# Patient Record
Sex: MALE | Race: WHITE | URBAN - METROPOLITAN AREA
[De-identification: names, ages, dates, MRNs, and addresses within clinical notes are randomized per-mention and may not be internally consistent; named-entity substitution may affect disease eponyms.]

---

## 2018-04-10 ENCOUNTER — APPOINTMENT (RX ONLY)
Dept: URBAN - METROPOLITAN AREA CLINIC 12 | Facility: CLINIC | Age: 48
Setting detail: DERMATOLOGY
End: 2018-04-10

## 2018-04-10 DIAGNOSIS — D22 MELANOCYTIC NEVI: ICD-10-CM

## 2018-04-10 DIAGNOSIS — L71.8 OTHER ROSACEA: ICD-10-CM

## 2018-04-10 DIAGNOSIS — L91.8 OTHER HYPERTROPHIC DISORDERS OF THE SKIN: ICD-10-CM

## 2018-04-10 PROBLEM — D22.5 MELANOCYTIC NEVI OF TRUNK: Status: ACTIVE | Noted: 2018-04-10

## 2018-04-10 PROBLEM — L85.3 XEROSIS CUTIS: Status: ACTIVE | Noted: 2018-04-10

## 2018-04-10 PROBLEM — L70.0 ACNE VULGARIS: Status: ACTIVE | Noted: 2018-04-10

## 2018-04-10 PROBLEM — L29.8 OTHER PRURITUS: Status: ACTIVE | Noted: 2018-04-10

## 2018-04-10 PROCEDURE — ? TREATMENT REGIMEN

## 2018-04-10 PROCEDURE — ? IN-HOUSE DISPENSING PHARMACY

## 2018-04-10 PROCEDURE — ? COUNSELING

## 2018-04-10 PROCEDURE — ? DEFER

## 2018-04-10 PROCEDURE — ? PRESCRIPTION

## 2018-04-10 PROCEDURE — 99213 OFFICE O/P EST LOW 20 MIN: CPT

## 2018-04-10 RX ORDER — DOXYCYCLINE 40 MG/1
CAPSULE ORAL
Qty: 30 | Refills: 11 | Status: ERX | COMMUNITY
Start: 2018-04-10

## 2018-04-10 RX ADMIN — DOXYCYCLINE: 40 CAPSULE ORAL at 00:00

## 2018-04-10 ASSESSMENT — LOCATION SIMPLE DESCRIPTION DERM
LOCATION SIMPLE: RIGHT UPPER BACK
LOCATION SIMPLE: RIGHT CHEEK
LOCATION SIMPLE: LEFT AXILLARY VAULT
LOCATION SIMPLE: LEFT CHEEK

## 2018-04-10 ASSESSMENT — LOCATION DETAILED DESCRIPTION DERM
LOCATION DETAILED: LEFT AXILLARY VAULT
LOCATION DETAILED: RIGHT CENTRAL MALAR CHEEK
LOCATION DETAILED: RIGHT INFERIOR MEDIAL UPPER BACK
LOCATION DETAILED: LEFT CENTRAL MALAR CHEEK

## 2018-04-10 ASSESSMENT — LOCATION ZONE DERM
LOCATION ZONE: AXILLAE
LOCATION ZONE: FACE
LOCATION ZONE: TRUNK

## 2018-04-10 NOTE — PROCEDURE: TREATMENT REGIMEN
Initiate Treatment: In-house Rosacea cream once to twice per day.\\nTake Oracea 40mg per day.
Detail Level: Simple

## 2018-04-10 NOTE — HPI: RASH
How Severe Is Your Rash?: moderate
Is This A New Presentation, Or A Follow-Up?: Rash
Additional History: Patient said he has been using Desonide off and on over the years.  He said sun exposure, sunscreen and heat seem to trigger the redness. He has been diagnosed with Rosacea and fungal infection before.  He has taken oral doxycycline, metrogel and Desonide.  He has also used topical and oral Ketaconazole in the past which has worked really well.

## 2018-04-10 NOTE — HPI: SKIN LESION
Is This A New Presentation, Or A Follow-Up?: Skin Lesions
How Severe Is Your Skin Lesion?: moderate
Has Your Skin Lesion Been Treated?: not been treated
Additional History: Patient wants the growths removed at some point but not today.

## 2018-06-14 ENCOUNTER — APPOINTMENT (RX ONLY)
Dept: URBAN - METROPOLITAN AREA CLINIC 12 | Facility: CLINIC | Age: 48
Setting detail: DERMATOLOGY
End: 2018-06-14

## 2018-06-14 DIAGNOSIS — D22 MELANOCYTIC NEVI: ICD-10-CM

## 2018-06-14 DIAGNOSIS — L71.8 OTHER ROSACEA: ICD-10-CM

## 2018-06-14 DIAGNOSIS — Z41.9 ENCOUNTER FOR PROCEDURE FOR PURPOSES OTHER THAN REMEDYING HEALTH STATE, UNSPECIFIED: ICD-10-CM

## 2018-06-14 DIAGNOSIS — L91.8 OTHER HYPERTROPHIC DISORDERS OF THE SKIN: ICD-10-CM

## 2018-06-14 PROBLEM — D22.5 MELANOCYTIC NEVI OF TRUNK: Status: ACTIVE | Noted: 2018-06-14

## 2018-06-14 PROCEDURE — ? COSMETIC CONSULTATION - RED SPOTS

## 2018-06-14 PROCEDURE — ? SKIN TAG REMOVAL MULTI

## 2018-06-14 PROCEDURE — ? IN-HOUSE DISPENSING PHARMACY

## 2018-06-14 PROCEDURE — 11200 RMVL SKIN TAGS UP TO&INC 15: CPT

## 2018-06-14 PROCEDURE — ? TREATMENT REGIMEN

## 2018-06-14 PROCEDURE — ? COUNSELING

## 2018-06-14 PROCEDURE — 99213 OFFICE O/P EST LOW 20 MIN: CPT | Mod: 25

## 2018-06-14 ASSESSMENT — LOCATION DETAILED DESCRIPTION DERM
LOCATION DETAILED: RIGHT INFERIOR MEDIAL UPPER BACK
LOCATION DETAILED: RIGHT CENTRAL MALAR CHEEK
LOCATION DETAILED: LEFT POSTERIOR AXILLA
LOCATION DETAILED: LEFT CENTRAL MALAR CHEEK
LOCATION DETAILED: RIGHT BUTTOCK
LOCATION DETAILED: RIGHT CENTRAL MALAR CHEEK
LOCATION DETAILED: LEFT CENTRAL MALAR CHEEK

## 2018-06-14 ASSESSMENT — LOCATION ZONE DERM
LOCATION ZONE: FACE
LOCATION ZONE: TRUNK
LOCATION ZONE: AXILLAE
LOCATION ZONE: FACE

## 2018-06-14 ASSESSMENT — LOCATION SIMPLE DESCRIPTION DERM
LOCATION SIMPLE: RIGHT BUTTOCK
LOCATION SIMPLE: LEFT CHEEK
LOCATION SIMPLE: LEFT CHEEK
LOCATION SIMPLE: RIGHT CHEEK
LOCATION SIMPLE: RIGHT CHEEK
LOCATION SIMPLE: LEFT POSTERIOR AXILLA
LOCATION SIMPLE: RIGHT UPPER BACK

## 2018-06-14 NOTE — PROCEDURE: SKIN TAG REMOVAL MULTI
Include Z78.9 (Other Specified Conditions Influencing Health Status) As An Associated Diagnosis?: No
Total Number Of Lesions Treated: 1
Consent: Written consent obtained and the risks of skin tag removal was reviewed with the patient including but not limited to bleeding, pigmentary change, infection, pain, and remote possibility of scarring.
Medical Necessity Information: It is in your best interest to select a reason for this procedure from the list below. All of these items fulfill various CMS LCD requirements except the new and changing color options.
Hemostasis: Electrocautery
Anesthesia Volume In Cc: 0
Detail Level: Simple
Add Associated Diagnoses If Applicable When Selecting Medical Necessity: Yes
Medical Necessity Clause: This procedure was medically necessary because the lesions that were treated were: irritated
Anesthesia Type: 1% lidocaine with epinephrine and a 1:10 solution of 8.4% sodium bicarbonate

## 2018-06-14 NOTE — PROCEDURE: TREATMENT REGIMEN
Detail Level: Simple
Initiate Treatment: In-house Rosacea cream once to twice per day.\\nTake Oracea 40mg per day.

## 2018-06-14 NOTE — PROCEDURE: IN-HOUSE DISPENSING PHARMACY
Product 1 Price/Unit (In Dollars): 40.00
Product 71 Units Dispensed: 0
Product 8 Application Directions: Apply topically to scalp three times per week for 4-6weeks
Product 10 Amount/Unit (Numbers Only): 30
Product 25 Unit Type: ml
Product 19 Unit Type: mg
Product 16 Refills: 2
Product 18 Refills: 3
Product 7 Application Directions: Apply topically to face Qhs
Product 7 Price/Unit (In Dollars): 45.00
Product 7 Amount/Unit (Numbers Only): 30
Product 11 Application Directions: Apply topically to  affected area's QD-BID
Name Of Product 3: Tret 0.1% Cream - ([id number])\\nNiacinamide 4% - Tretinoin 0.1%
Product 4 Application Directions: Apply a pea-sized amount to face at bedtime
Product 6 Price/Unit (In Dollars): 35.00
Product 12 Application Directions: Apply to affected areas bid for up to 2 weeks
Name Of Product 11: Desonide Dermatitis Cream - ([id number]) \\nDesonide 0.05% - Niacinamide 4%
Product 31 Refills: 1
Product 2 Application Directions: Apply a pea size amount to face every night
Product 18 Application Directions: Apply to warts then occlusion at bedtime
Product 14 Dang/Unit (In Dollars): 25.00
Product 1 Application Directions: Apply a pea sized amount for entire face at bed time.
Name Of Product 5: Acne Gel with Dapsone  ([id number])\\nDapsone 8.5% - Niacinamide 2% - Spironolactone 5%
Product 16 Application Directions: Apply topically to affected area bid for 2 weeks then once per day for a week then discontinue
Detail Level: Zone
Product 6 Application Directions: Apply topically to affected area's QD - BID
Name Of Product 13: Clob Ointment - ([id number]) \\nClobetasol Propionate 0.05% - Niacinamide 4%
Render Product Pricing In Note: Yes
Product 10 Application Directions: Apply to wound BID X 1-2 weeks
Product 14 Amount/Unit (Numbers Only): 60
Name Of Product 14: Clob Cream - ([id number]) \\nClobetasol Propionate 0.05% - Niacinamide 4%
Product 12 Amount/Unit (Numbers Only): 60
Product 2 Refills: 6
Product 15 Application Directions: Use bid for 2 weeks then stop using for 10-14 days before reusing
Name Of Product 17: Rosacea Cream - ([id number])\\nIvermectin 1% - Metronidazole 1% - Niacinamide 4% - Potassium Azeloyl Diglycinate 5%
Name Of Product 7: Acne Gel  ([id number])\\nAdapalene 0.3% - Benzoyl Peroxide 2.5% - Niacinamide - 4%
Product 10 Price/Unit (In Dollars): 20.00
Name Of Product 2: Tret 0.05% Cream - ([id number])\\n Niacinamide 4% - Tretinoin 0.05%
Product 7 Refills: 4
Product 23 Unit Type: grams
Name Of Product 12: Clob Solution - (659027) \\nClobetasol Propionate 0.05% - Niacinamide 4%
Name Of Product 10: Antibacterial Wound Ointment  ([id number]) \\nAloe Vera 0.2% - Lidocaine 2% - Mupirocin 2%-Tranilast 1%
Name Of Product 4: Archie 0.1% Cream - (016561) \\nNiacinamide 4% - Tazoratene 0.1%
Name Of Product 1: Tret 0.025% Cream - (552610)\\n QQNiacinamide 4% - Tretinoin 0.025%
Name Of Product 9: Anti-Fungal Nail Solution (921318) \\nCiclopirox 8% - Fluconazole 1% - Terbinafine 1%
Product 9 Amount/Unit (Numbers Only): 15
Name Of Product 18: Wart Gel - (957525) \\nCimetidine 5% - Ibuprofen 2% - Salicylic Acid 17%
Product 13 Application Directions: Apply to affected areas as directed.
Name Of Product 6: Acne Gel Combo   ([id number])\\nBenzoyl Peroxide 5% - Clindamycin 1% - Niacinamide 4%
Product 9 Application Directions: Apply to affected toenails QD for 1 year.
Product 17 Application Directions: Apply topically to face QD
Name Of Product 8: Actinic Keratosis Gel  ([id number])\\nImiquimod 5% - Levocetirizine Dihydrochloride 1% - Niacinamide 2%
Product 14 Application Directions: Apply bid to affected areas for up to 3 weeks then stop using 10-14 days before reusing.
Name Of Product 15: Triam Dermatitis Cream - (140950) \\nNiacinamide 4% - Triamcinolone Acetonide 0.1%
Name Of Product 16: Fluocinolone Scalp & Body Oil - (014076) \\nFluocinolone Acetonide 0.01% In Emu and Olive Oil

## 2018-06-26 ENCOUNTER — APPOINTMENT (RX ONLY)
Dept: URBAN - METROPOLITAN AREA CLINIC 12 | Facility: CLINIC | Age: 48
Setting detail: DERMATOLOGY
End: 2018-06-26

## 2018-06-26 DIAGNOSIS — Z41.9 ENCOUNTER FOR PROCEDURE FOR PURPOSES OTHER THAN REMEDYING HEALTH STATE, UNSPECIFIED: ICD-10-CM

## 2018-06-26 PROCEDURE — ? PALOMAR ICON LASER

## 2018-06-26 ASSESSMENT — LOCATION DETAILED DESCRIPTION DERM
LOCATION DETAILED: LEFT CENTRAL MALAR CHEEK
LOCATION DETAILED: RIGHT CENTRAL MALAR CHEEK
LOCATION DETAILED: LEFT INFERIOR CENTRAL MALAR CHEEK
LOCATION DETAILED: NASAL DORSUM

## 2018-06-26 ASSESSMENT — LOCATION ZONE DERM
LOCATION ZONE: FACE
LOCATION ZONE: NOSE

## 2018-06-26 ASSESSMENT — LOCATION SIMPLE DESCRIPTION DERM
LOCATION SIMPLE: RIGHT CHEEK
LOCATION SIMPLE: LEFT CHEEK
LOCATION SIMPLE: NOSE

## 2018-06-26 NOTE — PROCEDURE: PALOMAR ICON LASER
Detail Level: Zone
Fluence: 38
Anesthesia Volume In Cc: 0
Overlap: 20%
Treatment Number: 1
Fluence Units: J/cm2
Price (Use Numbers Only, No Special Characters Or $): 200.00
Pulse Duration (In Milliseconds): 10
Treated Area: small area
Consent: Written consent obtained, risks reviewed including but not limited to crusting, scabbing, blistering, scarring, darker or lighter pigmentary change, bruising, and/or incomplete response.
Render Post Care In The Note?: yes
Pre-Procedure Text: The patients skin was cleaned with 70% Isopropyl Alcohol.
Post-Care Instructions: I reviewed with the patient in detail post-care instructions. Patient should stay away from the sun and wear sun protection until treated areas are fully healed.
Fluence: 38
Fluence: 40
Pulse Duration (In Milliseconds): 20

## 2018-07-12 ENCOUNTER — APPOINTMENT (RX ONLY)
Dept: URBAN - METROPOLITAN AREA CLINIC 12 | Facility: CLINIC | Age: 48
Setting detail: DERMATOLOGY
End: 2018-07-12

## 2018-07-12 DIAGNOSIS — Z41.9 ENCOUNTER FOR PROCEDURE FOR PURPOSES OTHER THAN REMEDYING HEALTH STATE, UNSPECIFIED: ICD-10-CM

## 2018-07-12 PROCEDURE — ? PALOMAR ICON LASER

## 2018-07-12 ASSESSMENT — LOCATION DETAILED DESCRIPTION DERM
LOCATION DETAILED: RIGHT CENTRAL MALAR CHEEK
LOCATION DETAILED: NASAL TIP
LOCATION DETAILED: LEFT CENTRAL MALAR CHEEK
LOCATION DETAILED: RIGHT INFERIOR MEDIAL MALAR CHEEK

## 2018-07-12 ASSESSMENT — LOCATION ZONE DERM
LOCATION ZONE: FACE
LOCATION ZONE: NOSE

## 2018-07-12 ASSESSMENT — LOCATION SIMPLE DESCRIPTION DERM
LOCATION SIMPLE: RIGHT CHEEK
LOCATION SIMPLE: NOSE
LOCATION SIMPLE: LEFT CHEEK

## 2018-07-12 NOTE — PROCEDURE: PALOMAR ICON LASER
Price (Use Numbers Only, No Special Characters Or $): 0.00
Anesthesia Volume In Cc: 0
Number Of Passes: 1
Detail Level: Zone
Fluence: 38
Overlap: 20%
Fluence Units: J/cm2
Treatment Number: 2
Treated Area: small area
Pulse Duration (In Milliseconds): 15
Pre-Procedure Text: The patients skin was cleaned with 70% Isopropyl Alcohol.
Pulse Duration (In Milliseconds): 20
Post-Care Instructions: I reviewed with the patient in detail post-care instructions. Patient should stay away from the sun and wear sun protection until treated areas are fully healed.
Pulse Duration (In Milliseconds): 20
Fluence: 40
Fluence: 36
Render Post Care In The Note?: yes
Consent: Written consent obtained, risks reviewed including but not limited to crusting, scabbing, blistering, scarring, darker or lighter pigmentary change, bruising, and/or incomplete response.

## 2019-01-08 ENCOUNTER — APPOINTMENT (RX ONLY)
Dept: URBAN - METROPOLITAN AREA CLINIC 12 | Facility: CLINIC | Age: 49
Setting detail: DERMATOLOGY
End: 2019-01-08

## 2019-01-08 DIAGNOSIS — L71.8 OTHER ROSACEA: ICD-10-CM

## 2019-01-08 PROCEDURE — ? TREATMENT REGIMEN

## 2019-01-08 PROCEDURE — 99212 OFFICE O/P EST SF 10 MIN: CPT

## 2019-01-08 PROCEDURE — ? IN-HOUSE DISPENSING PHARMACY

## 2019-01-08 PROCEDURE — ? COUNSELING

## 2019-01-08 ASSESSMENT — LOCATION DETAILED DESCRIPTION DERM
LOCATION DETAILED: NASAL SUPRATIP
LOCATION DETAILED: LEFT MEDIAL MALAR CHEEK
LOCATION DETAILED: RIGHT CENTRAL MALAR CHEEK
LOCATION DETAILED: RIGHT MEDIAL MALAR CHEEK

## 2019-01-08 ASSESSMENT — LOCATION ZONE DERM
LOCATION ZONE: NOSE
LOCATION ZONE: FACE

## 2019-01-08 ASSESSMENT — LOCATION SIMPLE DESCRIPTION DERM
LOCATION SIMPLE: RIGHT CHEEK
LOCATION SIMPLE: LEFT CHEEK
LOCATION SIMPLE: NOSE

## 2019-01-08 NOTE — PROCEDURE: IN-HOUSE DISPENSING PHARMACY
Product 63 Refills: 0
Product 23 Unit Type: grams
Product 72 Unit Type: mg
Product 14 Dang/Unit (In Dollars): 45.00
Product 8 Amount/Unit (Numbers Only): 30
Product 11 Price/Unit (In Dollars): 35.00
Product 21 Application Directions: Apply to affected areas bid x 1 week, qd x 1 week then taper off
Product 1 Refills: 6
Product 12 Unit Type: ml
Product 4 Refills: 3
Product 16 Amount/Unit (Numbers Only): 60
Product 14 Refills: 1
Product 15 Application Directions: Use bid for 2 weeks then stop using for 10-14 days before reusing
Detail Level: Zone
Name Of Product 13: Clob Ointment - ([id number]) \\nClobetasol Propionate 0.05% - Niacinamide 4%
Name Of Product 8: Actinic Keratosis Gel  ([id number])\\nImiquimod 5% - Levocetirizine Dihydrochloride 1% - Niacinamide 2%
Product 2 Application Directions: Apply a pea size amount to face every night
Product 10 Application Directions: Apply to wound BID X 1-2 weeks
Product 6 Refills: 2
Product 3 Application Directions: Apply a pea sized amount for entire face at bed time.
Product 5 Amount/Unit (Numbers Only): 30
Product 14 Amount/Unit (Numbers Only): 60
Send Charges To Patient Encounter: Yes
Product 7 Refills: 4
Product 13 Application Directions: Apply to affected areas as directed.
Product 18 Application Directions: Apply to warts then occlusion at bedtime
Name Of Product 10: Antibacterial Wound Ointment  ([id number]) \\nAloe Vera 0.2% - Lidocaine 2% - Mupirocin 2%-Tranilast 1%
Name Of Product 21: Fluocinolone cream 0.01%
Name Of Product 15: Triam Dermatitis Cream - (457058) \\nNiacinamide 4% - Triamcinolone Acetonide 0.1%
Name Of Product 5: Acne Gel with Dapsone  ([id number])\\nDapsone 8.5% - Niacinamide 2% - Spironolactone 5%
Name Of Product 2: Tret 0.05% Cream - (416346)\\n Niacinamide 4% - Tretinoin 0.05%
Product 8 Application Directions: Apply topically to affected areas Qd Monday, Wednesday and Friday for 4-6 weeks.
Name Of Product 3: Tret 0.1% Cream - (167030)\\nNiacinamide 4% - Tretinoin 0.1%
Product 1 Price/Unit (In Dollars): 40.00
Name Of Product 6: Acne Gel Combo   ([id number])\\nBenzoyl Peroxide 5% - Clindamycin 1% - Niacinamide 4%
Product 16 Application Directions: Apply topically to affected area bid for 2 weeks then once per day for a week then discontinue
Name Of Product 1: Tret 0.025% Cream - (368057)\\n QQNiacinamide 4% - Tretinoin 0.025%
Name Of Product 12: Clob Solution - (930444) \\nClobetasol Propionate 0.05% - Niacinamide 4%
Product 9 Application Directions: Apply to affected toenails Bid for 3-4 weeks
Name Of Product 18: Wart Gel - ([id number]) \\nCimetidine 5% - Ibuprofen 2% - Salicylic Acid 17%
Product 5 Application Directions: Apply topically to affected area's QD - BID
Product 4 Application Directions: Apply a pea-sized amount to face at bedtime
Product 9 Amount/Unit (Numbers Only): 15
Product 14 Application Directions: Apply bid to affected areas for up to 3 weeks then stop using 10-14 days before reusing.
Name Of Product 9: Anti-Fungal Nail Solution (104859) \\nCiclopirox 8% - Fluconazole 1% - Terbinafine 1%
Name Of Product 17: Rosacea Cream - (730842)\\nIvermectin 1% - Metronidazole 1% - Niacinamide 4% - Potassium Azeloyl Diglycinate 5%
Product 11 Application Directions: Apply topically to  affected area's QD-BID
Name Of Product 16: Fluocinolone Scalp & Body Oil - (556476) \\nFluocinolone Acetonide 0.01% In Emu and Olive Oil
Product 7 Application Directions: Apply topically to face Qhs
Product 17 Application Directions: Apply topically to face QD
Product 12 Application Directions: Apply to affected areas bid for up to 2 weeks
Name Of Product 4: Archie 0.1% Cream - (975842) \\nNiacinamide 4% - Tazoratene 0.1%
Product 10 Price/Unit (In Dollars): 20.00
Name Of Product 14: Clob Cream - (855151) \\nClobetasol Propionate 0.05% - Niacinamide 4%
Name Of Product 11: Desonide Dermatitis Cream - (132869) \\nDesonide 0.05% - Niacinamide 4%
Name Of Product 7: Acne Gel  ([id number])\\nAdapalene 0.3% - Benzoyl Peroxide 2.5% - Niacinamide - 4%

## 2019-01-08 NOTE — PROCEDURE: MIPS QUALITY
Detail Level: Generalized
Quality 110: Preventive Care And Screening: Influenza Immunization: Influenza Immunization not Administered because Patient Refused.
Quality 130: Documentation Of Current Medications In The Medical Record: Current Medications Documented

## 2019-06-27 ENCOUNTER — APPOINTMENT (RX ONLY)
Dept: URBAN - METROPOLITAN AREA CLINIC 12 | Facility: CLINIC | Age: 49
Setting detail: DERMATOLOGY
End: 2019-06-27

## 2019-06-27 DIAGNOSIS — Z41.9 ENCOUNTER FOR PROCEDURE FOR PURPOSES OTHER THAN REMEDYING HEALTH STATE, UNSPECIFIED: ICD-10-CM

## 2019-06-27 PROCEDURE — ? PALOMAR ICON LASER

## 2019-06-27 ASSESSMENT — LOCATION ZONE DERM
LOCATION ZONE: FACE
LOCATION ZONE: NOSE

## 2019-06-27 ASSESSMENT — LOCATION DETAILED DESCRIPTION DERM
LOCATION DETAILED: LEFT INFERIOR CENTRAL MALAR CHEEK
LOCATION DETAILED: NASAL DORSUM
LOCATION DETAILED: NASAL INFRATIP
LOCATION DETAILED: LEFT CENTRAL MALAR CHEEK
LOCATION DETAILED: RIGHT CENTRAL MALAR CHEEK
LOCATION DETAILED: RIGHT INFERIOR CENTRAL MALAR CHEEK

## 2019-06-27 ASSESSMENT — LOCATION SIMPLE DESCRIPTION DERM
LOCATION SIMPLE: LEFT CHEEK
LOCATION SIMPLE: NOSE
LOCATION SIMPLE: RIGHT CHEEK

## 2019-06-27 NOTE — PROCEDURE: PALOMAR ICON LASER
Consent: Written consent obtained, risks reviewed including but not limited to crusting, scabbing, blistering, scarring, darker or lighter pigmentary change, bruising, and/or incomplete response.
Number Of Passes: 1
Awake/Alert/Cooperative
Detail Level: Zone
Pulse Duration (In Milliseconds): 15
Fluence: 38
External Cooling Fan Speed: 0
Number Of Passes: 2
Render Post Care In The Note?: yes
Pulse Duration (In Milliseconds): 10
Fluence: 38
Price (Use Numbers Only, No Special Characters Or $): 200..00
Pre-Procedure Text: The patients skin was cleaned with 70% Isopropyl Alcohol.
Overlap: 20%
Fluence Units: J/cm2
Treated Area: small area
Post-Care Instructions: I reviewed with the patient in detail post-care instructions. Patient should stay away from the sun and wear sun protection until treated areas are fully healed.
Fluence: 40

## 2020-01-07 ENCOUNTER — APPOINTMENT (RX ONLY)
Dept: URBAN - METROPOLITAN AREA CLINIC 12 | Facility: CLINIC | Age: 50
Setting detail: DERMATOLOGY
End: 2020-01-07

## 2020-01-07 DIAGNOSIS — L21.8 OTHER SEBORRHEIC DERMATITIS: ICD-10-CM

## 2020-01-07 DIAGNOSIS — D22 MELANOCYTIC NEVI: ICD-10-CM

## 2020-01-07 PROBLEM — D22.5 MELANOCYTIC NEVI OF TRUNK: Status: ACTIVE | Noted: 2020-01-07

## 2020-01-07 PROCEDURE — ? PRESCRIPTION

## 2020-01-07 PROCEDURE — ? COUNSELING

## 2020-01-07 PROCEDURE — 99213 OFFICE O/P EST LOW 20 MIN: CPT

## 2020-01-07 RX ORDER — IODOQUINOL, HYDROCORTISONE ACETATE AND ALOE VERA LEAF 10; 20; 10 MG/G; MG/G; MG/G
GEL TOPICAL
Qty: 1 | Refills: 2 | Status: ERX | COMMUNITY
Start: 2020-01-07

## 2020-01-07 RX ADMIN — IODOQUINOL, HYDROCORTISONE ACETATE AND ALOE VERA LEAF: 10; 20; 10 GEL TOPICAL at 00:00

## 2020-01-07 ASSESSMENT — LOCATION SIMPLE DESCRIPTION DERM
LOCATION SIMPLE: UPPER BACK
LOCATION SIMPLE: RIGHT UPPER BACK
LOCATION SIMPLE: RIGHT CHEEK
LOCATION SIMPLE: LEFT CHEEK

## 2020-01-07 ASSESSMENT — LOCATION ZONE DERM
LOCATION ZONE: TRUNK
LOCATION ZONE: FACE

## 2020-01-07 ASSESSMENT — LOCATION DETAILED DESCRIPTION DERM
LOCATION DETAILED: LEFT INFERIOR MEDIAL MALAR CHEEK
LOCATION DETAILED: INFERIOR THORACIC SPINE
LOCATION DETAILED: RIGHT INFERIOR MEDIAL MALAR CHEEK
LOCATION DETAILED: RIGHT INFERIOR MEDIAL UPPER BACK

## 2020-10-15 ENCOUNTER — APPOINTMENT (RX ONLY)
Dept: URBAN - METROPOLITAN AREA CLINIC 12 | Facility: CLINIC | Age: 50
Setting detail: DERMATOLOGY
End: 2020-10-15

## 2020-10-15 VITALS — TEMPERATURE: 96.7 F

## 2020-10-15 DIAGNOSIS — L30.1 DYSHIDROSIS [POMPHOLYX]: ICD-10-CM

## 2020-10-15 DIAGNOSIS — L73.8 OTHER SPECIFIED FOLLICULAR DISORDERS: ICD-10-CM

## 2020-10-15 DIAGNOSIS — L21.8 OTHER SEBORRHEIC DERMATITIS: ICD-10-CM

## 2020-10-15 DIAGNOSIS — L71.8 OTHER ROSACEA: ICD-10-CM

## 2020-10-15 PROCEDURE — 99213 OFFICE O/P EST LOW 20 MIN: CPT

## 2020-10-15 PROCEDURE — ? COUNSELING

## 2020-10-15 PROCEDURE — ? PRESCRIPTION

## 2020-10-15 RX ORDER — TRIAMCINOLONE ACETONIDE 1 MG/G
OINTMENT TOPICAL
Qty: 1 | Refills: 0 | Status: ERX | COMMUNITY
Start: 2020-10-15

## 2020-10-15 RX ORDER — HYDROCORTISONE AND IODOCHLORHYDROXYQUIN 5; 30 MG/G; MG/G
CREAM TOPICAL
Qty: 1 | Refills: 2 | Status: ERX | COMMUNITY
Start: 2020-10-15

## 2020-10-15 RX ADMIN — TRIAMCINOLONE ACETONIDE: 1 OINTMENT TOPICAL at 00:00

## 2020-10-15 RX ADMIN — HYDROCORTISONE AND IODOCHLORHYDROXYQUIN: 5; 30 CREAM TOPICAL at 00:00

## 2020-10-15 ASSESSMENT — LOCATION ZONE DERM
LOCATION ZONE: HAND
LOCATION ZONE: NOSE

## 2020-10-15 ASSESSMENT — PAIN INTENSITY VAS: HOW INTENSE IS YOUR PAIN 0 BEING NO PAIN, 10 BEING THE MOST SEVERE PAIN POSSIBLE?: 1/10 PAIN

## 2020-10-15 ASSESSMENT — LOCATION DETAILED DESCRIPTION DERM
LOCATION DETAILED: NASAL DORSUM
LOCATION DETAILED: LEFT RADIAL PALM
LOCATION DETAILED: RIGHT RADIAL PALM

## 2020-10-15 ASSESSMENT — LOCATION SIMPLE DESCRIPTION DERM
LOCATION SIMPLE: LEFT HAND
LOCATION SIMPLE: RIGHT HAND
LOCATION SIMPLE: NOSE

## 2020-10-15 ASSESSMENT — SEVERITY ASSESSMENT: HOW SEVERE IS THIS PATIENT'S CONDITION?: MILD

## 2020-10-15 ASSESSMENT — SEVERITY ASSESSMENT 2020: SEVERITY 2020: MILD

## 2020-10-15 NOTE — HPI: SECONDARY COMPLAINT
How Severe Is This Condition?: moderate
Additional History: Area is starting to heal after he used Hydrocortisone Cream. But the condition just came on suddenly.

## 2021-01-28 ENCOUNTER — APPOINTMENT (RX ONLY)
Dept: URBAN - METROPOLITAN AREA CLINIC 12 | Facility: CLINIC | Age: 51
Setting detail: DERMATOLOGY
End: 2021-01-28

## 2021-01-28 VITALS — TEMPERATURE: 97.3 F

## 2021-01-28 DIAGNOSIS — D485 NEOPLASM OF UNCERTAIN BEHAVIOR OF SKIN: ICD-10-CM

## 2021-01-28 DIAGNOSIS — L60.8 OTHER NAIL DISORDERS: ICD-10-CM

## 2021-01-28 DIAGNOSIS — L30.1 DYSHIDROSIS [POMPHOLYX]: ICD-10-CM

## 2021-01-28 PROBLEM — D48.5 NEOPLASM OF UNCERTAIN BEHAVIOR OF SKIN: Status: ACTIVE | Noted: 2021-01-28

## 2021-01-28 PROCEDURE — ? TREATMENT REGIMEN

## 2021-01-28 PROCEDURE — 99213 OFFICE O/P EST LOW 20 MIN: CPT | Mod: 25

## 2021-01-28 PROCEDURE — 11102 TANGNTL BX SKIN SINGLE LES: CPT

## 2021-01-28 PROCEDURE — ? COUNSELING

## 2021-01-28 PROCEDURE — ? BIOPSY BY SHAVE METHOD

## 2021-01-28 ASSESSMENT — LOCATION DETAILED DESCRIPTION DERM
LOCATION DETAILED: RIGHT THUMBNAIL
LOCATION DETAILED: LEFT SMALL FINGERNAIL
LOCATION DETAILED: LEFT MIDDLE FINGERNAIL
LOCATION DETAILED: PERIUNGUAL SKIN LEFT THUMB
LOCATION DETAILED: RIGHT KNEE
LOCATION DETAILED: RIGHT ANTERIOR PROXIMAL UPPER ARM
LOCATION DETAILED: LEFT SUPERIOR UPPER BACK
LOCATION DETAILED: LEFT ANTERIOR SHOULDER
LOCATION DETAILED: RIGHT RING FINGERNAIL
LOCATION DETAILED: LEFT RING FINGERNAIL

## 2021-01-28 ASSESSMENT — LOCATION ZONE DERM
LOCATION ZONE: FINGER
LOCATION ZONE: ARM
LOCATION ZONE: LEG
LOCATION ZONE: FINGERNAIL
LOCATION ZONE: TRUNK

## 2021-01-28 ASSESSMENT — LOCATION SIMPLE DESCRIPTION DERM
LOCATION SIMPLE: RIGHT KNEE
LOCATION SIMPLE: RIGHT UPPER ARM
LOCATION SIMPLE: LEFT UPPER BACK
LOCATION SIMPLE: RIGHT HAND
LOCATION SIMPLE: LEFT MIDDLE FINGER
LOCATION SIMPLE: LEFT SHOULDER
LOCATION SIMPLE: RIGHT RING FINGER
LOCATION SIMPLE: LEFT RING FINGER
LOCATION SIMPLE: LEFT THUMB
LOCATION SIMPLE: LEFT SMALL FINGER

## 2021-01-28 NOTE — PROCEDURE: BIOPSY BY SHAVE METHOD
Detail Level: Detailed
Depth Of Biopsy: dermis
Was A Bandage Applied: Yes
Size Of Lesion In Cm: 0.6
X Size Of Lesion In Cm: 0
Biopsy Type: H and E
Biopsy Method: sterile single edge surgical blade
Anesthesia Type: 1% lidocaine with 1:100,000 epinephrine and a 1:12 solution of 8.4% sodium bicarbonate
Anesthesia Volume In Cc (Will Not Render If 0): 1
Hemostasis: Aluminum Chloride
Wound Care: Vaseline
Dressing: bandage
Destruction After The Procedure: No
Type Of Destruction Used: Curettage
Electrodesiccation Text: The wound bed was treated with electrodesiccation after the biopsy was performed.
Electrodesiccation And Curettage Text: The wound bed was treated with electrodesiccation and curettage x 3 after the biopsy was performed.
Silver Nitrate Text: The wound bed was treated with silver nitrate after the biopsy was performed.
Lab: [id number]
Path Notes (To The Dermatopathologist): 6mm
Consent: Written consent was obtained and risks were reviewed including but not limited to scarring, infection, bleeding, scabbing, incomplete removal, nerve damage and allergy to anesthesia.
Post-Care Instructions: I reviewed with the patient in detail post-care instructions. Patient is to keep the biopsy site dry overnight, and then apply bacitracin twice daily until healed. Patient may apply hydrogen peroxide soaks to remove any crusting.
Notification Instructions: Patient will be notified of biopsy results. However, patient instructed to call the office if not contacted within 2 weeks.
Billing Type: Third-Party Bill
Information: Selecting Yes will display possible errors in your note based on the variables you have selected. This validation is only offered as a suggestion for you. PLEASE NOTE THAT THE VALIDATION TEXT WILL BE REMOVED WHEN YOU FINALIZE YOUR NOTE. IF YOU WANT TO FAX A PRELIMINARY NOTE YOU WILL NEED TO TOGGLE THIS TO 'NO' IF YOU DO NOT WANT IT IN YOUR FAXED NOTE.

## 2021-01-28 NOTE — PROCEDURE: TREATMENT REGIMEN
Detail Level: Simple
Initiate Treatment: Use triamcinilone prescribed in previous visit on new areas rash has developed.

## 2021-01-29 ENCOUNTER — APPOINTMENT (RX ONLY)
Dept: URBAN - METROPOLITAN AREA CLINIC 12 | Facility: CLINIC | Age: 51
Setting detail: DERMATOLOGY
End: 2021-01-29

## 2021-01-29 VITALS — TEMPERATURE: 97.3 F

## 2021-01-29 DIAGNOSIS — Z41.9 ENCOUNTER FOR PROCEDURE FOR PURPOSES OTHER THAN REMEDYING HEALTH STATE, UNSPECIFIED: ICD-10-CM

## 2021-01-29 PROCEDURE — ? CYNOSURE APOGEE ELITE

## 2021-01-29 PROCEDURE — ? SMARTSKIN CO2 LASER

## 2021-01-29 ASSESSMENT — LOCATION ZONE DERM
LOCATION ZONE: FACE
LOCATION ZONE: NOSE

## 2021-01-29 ASSESSMENT — LOCATION SIMPLE DESCRIPTION DERM
LOCATION SIMPLE: LEFT CHEEK
LOCATION SIMPLE: SUPERIOR FOREHEAD
LOCATION SIMPLE: RIGHT CHEEK
LOCATION SIMPLE: NOSE

## 2021-01-29 ASSESSMENT — LOCATION DETAILED DESCRIPTION DERM
LOCATION DETAILED: LEFT CENTRAL MALAR CHEEK
LOCATION DETAILED: NASAL SUPRATIP
LOCATION DETAILED: NASAL TIP
LOCATION DETAILED: SUPERIOR MID FOREHEAD
LOCATION DETAILED: RIGHT CENTRAL MALAR CHEEK

## 2021-01-29 NOTE — PROCEDURE: CYNOSURE APOGEE ELITE
Detail Level: Zone
Laser Type: Nd:Yag 1064nm
Pulse Duration (Msec): 10
Price (Use Numbers Only, No Special Characters Or $): 100.00
Indication: telangiectasia
Post-Care Instructions: I reviewed with the patient in detail post-care instructions. Patient should stay away from the sun and wear sun protection until treated areas are fully healed.
Consent: Written consent obtained, risks reviewed including but not limited to crusting, scabbing, blistering, scarring, darker or lighter pigmentary change, incidental hair removal, bruising, and/or incomplete removal.
Fluence (J/Cm2): 150
Spot Size: 3 mm
Immediate Endpoint: erythema

## 2021-01-29 NOTE — PROCEDURE: SMARTSKIN CO2 LASER
External Cooling Fan Speed: 5
Pitch: [id number]
Number Of Passes: 1
Pitch: 300
Dwell: 1500
Pitch: [id number]
Dwell: 100
Pitch: [id number]
Number Of Passes: 2
Price (Use Numbers Only, No Special Characters Or $): 100.00
Pitch: 200
Location: full face except eyelids
Detail Level: Zone
Consent: Written consent obtained, risks reviewed including but not limited to pain and incomplete improvement of hyperhidrosis.
Alexander: 25
Anesthesia Volume In Cc: 0
Post-Care Instructions: I reviewed with the patient in detail post-care instructions. Patient should avoid sun until area fully healed.
External Cooling: Linda Cryo 6
Dwell: 1600
Indication: resurfacing
Dwell: 1500

## 2021-06-03 ENCOUNTER — APPOINTMENT (RX ONLY)
Dept: URBAN - METROPOLITAN AREA CLINIC 134 | Facility: CLINIC | Age: 51
Setting detail: DERMATOLOGY
End: 2021-06-03

## 2021-06-03 DIAGNOSIS — Z71.89 OTHER SPECIFIED COUNSELING: ICD-10-CM

## 2021-06-03 DIAGNOSIS — T07XXXA INSECT BITE, NONVENOMOUS, OF OTHER, MULTIPLE, AND UNSPECIFIED SITES, WITHOUT MENTION OF INFECTION: ICD-10-CM

## 2021-06-03 PROBLEM — S40.862A INSECT BITE (NONVENOMOUS) OF LEFT UPPER ARM, INITIAL ENCOUNTER: Status: ACTIVE | Noted: 2021-06-03

## 2021-06-03 PROCEDURE — ? COUNSELING

## 2021-06-03 PROCEDURE — 99213 OFFICE O/P EST LOW 20 MIN: CPT

## 2021-06-03 PROCEDURE — ? TREATMENT REGIMEN

## 2021-06-03 PROCEDURE — ? PRESCRIPTION

## 2021-06-03 RX ORDER — TRIAMCINOLONE ACETONIDE 1 MG/G
CREAM TOPICAL BID
Qty: 1 | Refills: 0 | Status: ERX | COMMUNITY
Start: 2021-06-03

## 2021-06-03 RX ADMIN — TRIAMCINOLONE ACETONIDE: 1 CREAM TOPICAL at 00:00

## 2021-06-03 ASSESSMENT — LOCATION SIMPLE DESCRIPTION DERM
LOCATION SIMPLE: LEFT UPPER BACK
LOCATION SIMPLE: LEFT UPPER ARM

## 2021-06-03 ASSESSMENT — LOCATION ZONE DERM
LOCATION ZONE: TRUNK
LOCATION ZONE: ARM

## 2021-06-03 ASSESSMENT — LOCATION DETAILED DESCRIPTION DERM
LOCATION DETAILED: LEFT ANTERIOR DISTAL UPPER ARM
LOCATION DETAILED: LEFT SUPERIOR UPPER BACK

## 2025-02-07 NOTE — PROCEDURE: IN-HOUSE DISPENSING PHARMACY
Continue BP  medications.   No swelling reported.   Monitoring blood pressure at home 1-2 times a week.     
Product 19 Refills: 0
Send Charges To Patient Encounter: Yes
Product 2 Application Directions: Apply a pea size amount to face every night
Product 36 Unit Type: mg
Name Of Product 14: Clob Cream - (726348) \\nClobetasol Propionate 0.05% - Niacinamide 4%
Product 22 Unit Type: ml
Product 11 Application Directions: Apply topically to  affected area's QD-BID
Name Of Product 5: Acne Gel with Dapsone  ([id number])\\nDapsone 8.5% - Niacinamide 2% - Spironolactone 5%
Product 1 Refills: 6
Product 6 Price/Unit (In Dollars): 35.00
Name Of Product 1: Tret 0.025% Cream - (684858)\\n QQNiacinamide 4% - Tretinoin 0.025%
Name Of Product 16: Fluocinolone Scalp & Body Oil - (027795) \\nFluocinolone Acetonide 0.01% In Emu and Olive Oil
Name Of Product 7: Acne Gel  ([id number])\\nAdapalene 0.3% - Benzoyl Peroxide 2.5% - Niacinamide - 4%
Product 10 Amount/Unit (Numbers Only): 30
Product 13 Unit Type: grams
Product 13 Application Directions: Apply to affected areas as directed.
Product 6 Amount/Unit (Numbers Only): 30
Product 2 Price/Unit (In Dollars): 40.00
Product 15 Refills: 3
Product 8 Application Directions: Apply topically to scalp three times per week for 4-6weeks
Name Of Product 17: Rosacea Cream - ([id number])\\nIvermectin 1% - Metronidazole 1% - Niacinamide 4% - Potassium Azeloyl Diglycinate 5%
Product 4 Application Directions: Apply a pea-sized amount to face at bedtime
Product 14 Application Directions: Apply bid to affected areas for up to 3 weeks then stop using 10-14 days before reusing.
Product 10 Refills: 2
Product 5 Price/Unit (In Dollars): 45.00
Name Of Product 9: Anti-Fungal Nail Solution ([id number]) \\nCiclopirox 8% - Fluconazole 1% - Terbinafine 1%
Product 24 Refills: 1
Name Of Product 8: Actinic Keratosis Gel  ([id number])\\nImiquimod 5% - Levocetirizine Dihydrochloride 1% - Niacinamide 2%
Name Of Product 10: Antibacterial Wound Ointment  (521595) \\nAloe Vera 0.2% - Lidocaine 2% - Mupirocin 2%-Tranilast 1%
Detail Level: Zone
Product 12 Application Directions: Apply to affected areas bid for up to 2 weeks
Product 3 Application Directions: Apply a pea sized amount for entire face at bed time.
Product 5 Application Directions: Apply topically to affected area's QD - BID
Product 14 Dang/Unit (In Dollars): 25.00
Product 18 Amount/Unit (Numbers Only): 30
Name Of Product 3: Tret 0.1% Cream - (152190)\\nNiacinamide 4% - Tretinoin 0.1%
Product 13 Amount/Unit (Numbers Only): 60
Product 15 Application Directions: Use bid for 2 weeks then stop using for 10-14 days before reusing
Name Of Product 6: Acne Gel Combo   ([id number])\\nBenzoyl Peroxide 5% - Clindamycin 1% - Niacinamide 4%
Product 7 Application Directions: Apply topically to face Qhs
Name Of Product 13: Clob Ointment - (138552) \\nClobetasol Propionate 0.05% - Niacinamide 4%
Product 9 Application Directions: Apply to affected toenails QD for 1 year.
Product 16 Application Directions: Apply topically to affected area bid for 2 weeks then once per day for a week then discontinue
Product 17 Application Directions: Apply topically to face QD
Name Of Product 4: Archie 0.1% Cream - (052901) \\nNiacinamide 4% - Tazoratene 0.1%
Name Of Product 12: Clob Solution - (663731) \\nClobetasol Propionate 0.05% - Niacinamide 4%
Product 18 Application Directions: Apply to warts then occlusion at bedtime
Name Of Product 11: Desonide Dermatitis Cream - (812184) \\nDesonide 0.05% - Niacinamide 4%
Product 12 Amount/Unit (Numbers Only): 60
Name Of Product 2: Tret 0.05% Cream - (796691)\\n Niacinamide 4% - Tretinoin 0.05%
Name Of Product 18: Wart Gel - (133699) \\nCimetidine 5% - Ibuprofen 2% - Salicylic Acid 17%
Product 10 Price/Unit (In Dollars): 20.00
Product 10 Application Directions: Apply to wound BID X 1-2 weeks
Name Of Product 15: Triam Dermatitis Cream - (554826) \\nNiacinamide 4% - Triamcinolone Acetonide 0.1%
Product 9 Amount/Unit (Numbers Only): 15
Product 7 Refills: 4